# Patient Record
Sex: FEMALE | Race: WHITE | NOT HISPANIC OR LATINO | Employment: STUDENT | URBAN - NONMETROPOLITAN AREA
[De-identification: names, ages, dates, MRNs, and addresses within clinical notes are randomized per-mention and may not be internally consistent; named-entity substitution may affect disease eponyms.]

---

## 2023-10-10 PROBLEM — R55 SYNCOPE: Status: ACTIVE | Noted: 2023-10-10

## 2023-10-10 PROBLEM — R55 NEAR SYNCOPE: Status: ACTIVE | Noted: 2023-10-10

## 2023-10-10 PROBLEM — R07.9 CHEST PAIN: Status: ACTIVE | Noted: 2023-10-10

## 2023-10-10 PROBLEM — J45.30 MILD PERSISTENT ASTHMA (HHS-HCC): Status: ACTIVE | Noted: 2023-10-10

## 2023-10-10 PROBLEM — R42 DIZZINESS: Status: ACTIVE | Noted: 2023-10-10

## 2023-10-10 PROBLEM — I31.39 PERICARDIAL EFFUSION (HHS-HCC): Status: ACTIVE | Noted: 2023-10-10

## 2023-10-10 RX ORDER — ALBUTEROL SULFATE 90 UG/1
2 AEROSOL, METERED RESPIRATORY (INHALATION) 4 TIMES DAILY PRN
COMMUNITY
End: 2023-11-03 | Stop reason: SINTOL

## 2023-10-10 RX ORDER — FLUTICASONE PROPIONATE 50 MCG
2 SPRAY, SUSPENSION (ML) NASAL DAILY
COMMUNITY

## 2023-10-10 RX ORDER — IBUPROFEN 100 MG/5ML
SUSPENSION, ORAL (FINAL DOSE FORM) ORAL
COMMUNITY

## 2023-10-10 RX ORDER — FLUTICASONE FUROATE AND VILANTEROL 200; 25 UG/1; UG/1
1 POWDER RESPIRATORY (INHALATION) DAILY
COMMUNITY

## 2023-10-10 RX ORDER — LORATADINE 10 MG/1
1 TABLET ORAL DAILY
COMMUNITY

## 2023-10-10 RX ORDER — SERTRALINE HYDROCHLORIDE 50 MG/1
1 TABLET, FILM COATED ORAL DAILY
COMMUNITY

## 2023-10-10 RX ORDER — ASPIRIN 325 MG
TABLET, DELAYED RELEASE (ENTERIC COATED) ORAL
COMMUNITY

## 2023-10-11 ENCOUNTER — OFFICE VISIT (OUTPATIENT)
Dept: PULMONOLOGY | Facility: CLINIC | Age: 23
End: 2023-10-11
Payer: COMMERCIAL

## 2023-10-11 VITALS
SYSTOLIC BLOOD PRESSURE: 107 MMHG | WEIGHT: 126.9 LBS | HEIGHT: 63 IN | HEART RATE: 59 BPM | TEMPERATURE: 98.6 F | OXYGEN SATURATION: 98 % | DIASTOLIC BLOOD PRESSURE: 66 MMHG | BODY MASS INDEX: 22.48 KG/M2

## 2023-10-11 DIAGNOSIS — J45.30 MILD PERSISTENT ASTHMA, UNSPECIFIED WHETHER COMPLICATED (HHS-HCC): Primary | ICD-10-CM

## 2023-10-11 DIAGNOSIS — J30.2 SEASONAL ALLERGIES: ICD-10-CM

## 2023-10-11 PROCEDURE — 99214 OFFICE O/P EST MOD 30 MIN: CPT | Performed by: INTERNAL MEDICINE

## 2023-10-11 RX ORDER — ESTRADIOL 1 MG/1
1 TABLET ORAL DAILY
COMMUNITY

## 2023-10-11 RX ORDER — ALBUTEROL SULFATE 90 UG/1
2 AEROSOL, METERED RESPIRATORY (INHALATION) 4 TIMES DAILY PRN
Qty: 18 G | Refills: 2 | Status: SHIPPED | OUTPATIENT
Start: 2023-10-11 | End: 2023-11-03 | Stop reason: SDUPTHER

## 2023-10-11 NOTE — PROGRESS NOTES
Subjective   Patient ID: Danette Braxton is a 23 y.o. female who presents for Asthma (3 WEEK CK).  HPI  Patient was seen today in the office on a 3-week follow-up visit for her asthma and allergies.  She is currently on Breo inhaler 100/25 daily and Xyzal at 5 mg q. evening.  Patient reports to me that she has had some problems with the Proventil inhaler that she has been using this year and that the propellant appears to run out about [facility] through the doses on the device.  Last year she had been using the Ventolin inhalerAnd it worked very well for her all year long.  Review of Systems  On review of systems she denies having any fever, chills, rhinitis or sore throat.  She has no lower extremity edema.  All other review of systems were noncontributory.  Refer to the HPI.  Objective   Physical Exam  HEENT, there is no inflammation noted on examination of the oropharynx.  Pulmonary, lungs were clear to auscultation bilaterally.  Her peak flow today was 450 L/min.  Cardio, heart sounds are regular rate and rhythm.  Extremities, no pretibial edema, cyanosis or clubbing.  Psych, patient is alert and oriented x3  Assessment/Plan     Assessment:  1.  Asthma, mild persistent.  2.  Seasonal allergies.  Plan:  1.  Continue with the Breo inhaler at 200/25 daily.  2.  Would remain on the Xyzal 5 mg at nighttime.  I told her that she may be able to obtain some generic form of this drug had a big box store to help reduce the cost.  3.  We will order the brand name, Ventolin inhaler through her pharmacy.  4.  Follow-up with the patient in 2 months.  She is to contact our office if she has any change in her breathing in the interim.

## 2023-11-03 DIAGNOSIS — J45.30 MILD PERSISTENT ASTHMA, UNSPECIFIED WHETHER COMPLICATED (HHS-HCC): ICD-10-CM

## 2023-11-03 RX ORDER — ALBUTEROL SULFATE 90 UG/1
2 AEROSOL, METERED RESPIRATORY (INHALATION) 4 TIMES DAILY PRN
Qty: 18 G | Refills: 2 | Status: SHIPPED | OUTPATIENT
Start: 2023-11-03 | End: 2024-11-02

## 2023-11-07 DIAGNOSIS — J45.32 MILD PERSISTENT ASTHMA WITH STATUS ASTHMATICUS (HHS-HCC): ICD-10-CM

## 2023-11-07 RX ORDER — LEVALBUTEROL TARTRATE 45 UG/1
2 AEROSOL, METERED ORAL 4 TIMES DAILY PRN
Qty: 15 G | Refills: 5 | Status: SHIPPED | OUTPATIENT
Start: 2023-11-07 | End: 2024-11-06

## 2023-11-07 NOTE — TELEPHONE ENCOUNTER
Patient called and is requesting an alternative to Albuterol inhaler due to insurance cost. Insurance recommends Levalbuterol 45mcg. Per provider approval please send to pharmacy.

## 2023-12-04 ENCOUNTER — OFFICE VISIT (OUTPATIENT)
Dept: URGENT CARE | Facility: CLINIC | Age: 23
End: 2023-12-04
Payer: COMMERCIAL

## 2023-12-04 VITALS
DIASTOLIC BLOOD PRESSURE: 75 MMHG | HEIGHT: 63 IN | HEART RATE: 73 BPM | RESPIRATION RATE: 16 BRPM | TEMPERATURE: 98.1 F | OXYGEN SATURATION: 100 % | BODY MASS INDEX: 21.79 KG/M2 | SYSTOLIC BLOOD PRESSURE: 106 MMHG | WEIGHT: 123 LBS

## 2023-12-04 DIAGNOSIS — J06.9 ACUTE UPPER RESPIRATORY INFECTION: Primary | ICD-10-CM

## 2023-12-04 LAB — POC SARS-COV-2 AG: NORMAL

## 2023-12-04 PROCEDURE — 99213 OFFICE O/P EST LOW 20 MIN: CPT | Mod: 25 | Performed by: NURSE PRACTITIONER

## 2023-12-04 RX ORDER — METHYLPREDNISOLONE 4 MG/1
TABLET ORAL
Qty: 21 TABLET | Refills: 0 | Status: SHIPPED | OUTPATIENT
Start: 2023-12-04

## 2023-12-04 NOTE — PROGRESS NOTES
23 y.o. female presents for evaluation of URI.  Symptoms including cough, congestion, body aches, malaise, and headache have been present for several days and refractory to OTC meds.  No fever, chills, nausea, vomiting, abdominal pain, CP, or SOB.  No exacerbating factors. No known COVID 19/flu exposure.      Vitals:    12/04/23 1412   BP: 106/75   Pulse: 73   Resp: 16   Temp: 36.7 °C (98.1 °F)   SpO2: 100%       No Known Allergies    Medication Documentation Review Audit       Reviewed by Stephanie Steen MA (Medical Assistant) on 12/04/23 at 1412      Medication Order Taking? Sig Documenting Provider Last Dose Status   ascorbic acid (Vitamin C) 1,000 mg tablet [id number] No Take by mouth. Historical Provider, MD Not Taking Active   cholecalciferol (Vitamin D-3) 1,250 mcg (50,000 unit) capsule [id number] No Take by mouth. Historical Provider, MD Not Taking Active   estradiol (Estrace) 1 mg tablet [id number] Yes Take 1 tablet (1 mg) by mouth once daily. Historical Provider, MD Taking Active   fluticasone (Flonase) 50 mcg/actuation nasal spray [id number] Yes Administer 2 sprays into affected nostril(s) once daily. Historical Provider, MD Taking Active   fluticasone furoate-vilanteroL (Breo Ellipta) 200-25 mcg/dose inhaler [id number] Yes Inhale 1 puff once daily. Historical Provider, MD Taking Active   levalbuterol (Xopenex) 45 mcg/actuation inhaler [id number] No Inhale 2 puffs 4 times a day as needed for wheezing or shortness of breath.   Patient not taking: Reported on 12/4/2023    Luis Fernando Arce, DO Not Taking Active   loratadine (Claritin) 10 mg tablet [id number] No Take 1 tablet (10 mg) by mouth once daily. Historical Provider, MD Not Taking Active   sertraline (Zoloft) 50 mg tablet [id number] No Take 1 tablet (50 mg) by mouth once daily. Historical Provider, MD Not Taking Active   Ventolin HFA 90 mcg/actuation inhaler [id number] No Inhale 2 puffs 4 times a day as needed for wheezing or shortness of breath.   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   Constitutional:       Appearance: She is normal weight. She is ill-appearing (mildly).   HENT:      Head: Normocephalic and atraumatic.      Right Ear: Tympanic membrane, ear canal and external ear normal.      Left Ear: Tympanic membrane, ear canal and external ear normal.      Nose: Congestion present.      Mouth/Throat:      Mouth: Mucous membranes are moist.      Pharynx: Oropharynx is clear.   Eyes:      Extraocular Movements: Extraocular movements intact.      Conjunctiva/sclera: Conjunctivae normal.      Pupils: Pupils are equal, round, and reactive to light.   Cardiovascular:      Rate and Rhythm: Normal rate and regular rhythm.   Pulmonary:      Effort: Pulmonary effort is normal.      Breath sounds: Normal breath sounds.   Skin:     General: Skin is warm and dry.      Capillary Refill: Capillary refill takes less than 2 seconds.   Neurological:      General: No focal deficit present.      Mental Status: She is alert and oriented to person, place, and time.   Psychiatric:         Mood and Affect: Mood normal.         Behavior: Behavior normal.     Recent Results (from the past 1 hour(s))   POCT BD Veritor COVID-19 AG    Collection Time: 12/04/23  3:42 PM   Result Value Ref Range    POC SARS-CoV Ag  Presumptive negative test for SARS-CoV-2 (no antigen detected)     Presumptive negative test for SARS-CoV-2 (no antigen detected)         Assessment/Plan/MDM  Danette was seen today for uri.  Diagnoses and all orders for this visit:  Acute upper respiratory infection  (Primary)  -     POCT BD Veritor COVID-19 AG  -     methylPREDNISolone (Medrol Dospak) 4 mg tablets; Take as directed on package.    Encouraged pt to continue otc cold remedies PRN, push by mouth fluids and rest. Patient's clinical presentation is otherwise unremarkable at this time. Patient is discharged with instructions to follow-up with primary care or seek emergency medical attention for worsening symptoms or any new concerns.    Jose Alberto Orellana, CNP  [facility]  [phone]

## 2023-12-05 ENCOUNTER — OFFICE VISIT (OUTPATIENT)
Dept: PULMONOLOGY | Facility: CLINIC | Age: 23
End: 2023-12-05
Payer: COMMERCIAL

## 2023-12-05 VITALS
DIASTOLIC BLOOD PRESSURE: 70 MMHG | TEMPERATURE: 96.4 F | SYSTOLIC BLOOD PRESSURE: 107 MMHG | BODY MASS INDEX: 22.28 KG/M2 | WEIGHT: 125.8 LBS | HEART RATE: 76 BPM

## 2023-12-05 DIAGNOSIS — J30.2 SEASONAL ALLERGIES: ICD-10-CM

## 2023-12-05 DIAGNOSIS — J45.30 MILD PERSISTENT ASTHMA WITHOUT COMPLICATION (HHS-HCC): Primary | ICD-10-CM

## 2023-12-05 PROCEDURE — 1036F TOBACCO NON-USER: CPT | Performed by: INTERNAL MEDICINE

## 2023-12-05 PROCEDURE — 99214 OFFICE O/P EST MOD 30 MIN: CPT | Performed by: INTERNAL MEDICINE

## 2023-12-05 NOTE — PROGRESS NOTES
Subjective   Patient ID: Danette Braxton is a 23 y.o. female who presents for Asthma.  HPI  Patient was seen today on a 2-month follow-up visit for her asthma.  She was able to get the GQ equivalent of the Xyzal which she uses in the evening.  She reports that the lev albuterol has worked well for her and she has been using it 6 times per week with workouts that she does in the gymnasium.  Other than those situations she does not find it necessary to use albuterol.  She denies having any cough or sputum production and no wheezing.  She denies dyspnea with daily activities.  Review of Systems  Patient denies any fever, chills, rhinitis or sore throat.  She is not a smoker.  All other review of systems are noncontributory.  Refer to the [facility]  Objective   Physical Exam  Patient's oxygen saturation was 99% on room air today.  Her peak flow was 420 L/min.  HEENT, there is no inflammation on examination of the oral pharynx.  Cardio, heart sounds were regular rate and rhythm.  Pulmonary, lungs are clear to auscultation bilaterally.  Extremities, no pretibial edema, cyanosis or clubbing.  The patient does not appear dyspneic at rest in the office today.  Assessment/Plan        Impressions:  1.  Asthma, mild persistent.  2.  Seasonal allergies  Recommendations:  1.  Continue with the Breo inhaler at 200/25 daily.  2.  Xyzal 5 mg at bedtime.  3.  Lev albuterol inhaler to be used with her workouts 6 days a week.  4.  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      This note was transcribed using the Dragon Dictation system.  There may be grammatical, punctuation, or verbiage errors that occur with voice recognition programs.

## 2023-12-11 ENCOUNTER — APPOINTMENT (OUTPATIENT)
Dept: PULMONOLOGY | Facility: CLINIC | Age: 23
End: 2023-12-11
Payer: COMMERCIAL

## 2024-04-30 ENCOUNTER — APPOINTMENT (OUTPATIENT)
Dept: PULMONOLOGY | Facility: CLINIC | Age: 24
End: 2024-04-30
Payer: COMMERCIAL

## 2024-05-02 ENCOUNTER — OFFICE VISIT (OUTPATIENT)
Dept: PULMONOLOGY | Facility: CLINIC | Age: 24
End: 2024-05-02
Payer: COMMERCIAL

## 2024-05-02 VITALS
HEIGHT: 63 IN | HEART RATE: 76 BPM | SYSTOLIC BLOOD PRESSURE: 122 MMHG | BODY MASS INDEX: 22.15 KG/M2 | TEMPERATURE: 97.7 F | WEIGHT: 125 LBS | DIASTOLIC BLOOD PRESSURE: 77 MMHG

## 2024-05-02 DIAGNOSIS — Z87.09 HISTORY OF ASTHMA: Primary | ICD-10-CM

## 2024-05-02 PROCEDURE — 99214 OFFICE O/P EST MOD 30 MIN: CPT | Performed by: INTERNAL MEDICINE

## 2024-05-02 PROCEDURE — 1036F TOBACCO NON-USER: CPT | Performed by: INTERNAL MEDICINE

## 2024-05-02 NOTE — PROGRESS NOTES
Subjective   Patient ID: Danette Braxton is a 23 y.o. female who presents for No chief complaint on file..  HPI  Patient was seen today in the office on a 4-month follow-up visit.  Patient reports that she discontinued all of her asthma medications since December of last year.  COVID infection during that period of time.  Her peak flow today in the office was 520 L/min which is her best effort.  She reported to me that she had a number of individuals that prayed for her and that she believes that is what cured her asthma symptoms.  She denies having any cough or sputum production and no wheezing.  She denies any dyspnea.  Review of Systems  She has no problems with fever, chills, rhinitis or sore throat.  Objective   Physical Exam  Pulmonary, lungs were clear to auscultation bilaterally.  Cardio, heart sounds are regular rate and rhythm.  Extremities, no cyanosis, clubbing or pretibial edema.  Patient does not appear to be in any respiratory distress in the office today.  She is alert and oriented x 3.  Assessment/Plan        Impressions:  1.  History of asthma.  Recommendations:  1.  Based on her current status I do not see a need for reinstitution of any inhalers or other treatment at this time.  2.  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      This note was transcribed using the Dragon Dictation system.  There may be grammatical, punctuation, or verbiage errors that occur with voice recognition programs.    Luis Fernando Arce,  05/02/24 1:00 PM